# Patient Record
Sex: MALE | Race: WHITE | NOT HISPANIC OR LATINO | Employment: FULL TIME | URBAN - METROPOLITAN AREA
[De-identification: names, ages, dates, MRNs, and addresses within clinical notes are randomized per-mention and may not be internally consistent; named-entity substitution may affect disease eponyms.]

---

## 2021-12-30 ENCOUNTER — TELEPHONE (OUTPATIENT)
Dept: SCHEDULING | Facility: IMAGING CENTER | Age: 26
End: 2021-12-30

## 2022-02-08 ENCOUNTER — TELEMEDICINE (OUTPATIENT)
Dept: MEDICAL GROUP | Facility: PHYSICIAN GROUP | Age: 27
End: 2022-02-08
Payer: COMMERCIAL

## 2022-02-08 VITALS — WEIGHT: 270 LBS | RESPIRATION RATE: 18 BRPM | HEIGHT: 71 IN | BODY MASS INDEX: 37.8 KG/M2

## 2022-02-08 DIAGNOSIS — U07.1 COVID-19: ICD-10-CM

## 2022-02-08 PROCEDURE — 99203 OFFICE O/P NEW LOW 30 MIN: CPT | Performed by: INTERNAL MEDICINE

## 2022-02-08 RX ORDER — BENZONATATE 100 MG/1
100 CAPSULE ORAL 3 TIMES DAILY PRN
Qty: 60 CAPSULE | Refills: 0 | Status: SHIPPED | OUTPATIENT
Start: 2022-02-08 | End: 2022-04-19

## 2022-02-08 ASSESSMENT — PATIENT HEALTH QUESTIONNAIRE - PHQ9: CLINICAL INTERPRETATION OF PHQ2 SCORE: 0

## 2022-02-08 NOTE — PROGRESS NOTES
"This visit was conducted via  Zoom Video Virtual Visit using secure and encrypted videoconferencing technology.   The patient's identity was confirmed and verbal consent was obtained for this virtual visit.  -------------------------------------------------------------------------------    Chief Complaint   Patient presents with   • Establish Care       recent covid infection     HPI: This is a 27 y.o. pt.  Pt's medical history is notable for:      COVID-19  Pt reported positive covid test 1.27.22  First day symptoms began: 1.25.22    Current symptoms: lingering cough, loss of taste.  Patient denies fever chills shortness of breath or wheezing.    Presence of dyspnea [sob] [] Yes  [x]No  Patient does not have a pulse oximeter to check O2 saturation  Patient denies chest pain lethargy abdominal pain or chest pain.  No difficulty with eating and drinking.               REVIEW OF SYSTEMS:   As per HPI above. All other systems reviewed and negative.          Allergies: Patient has no allergy information on record.    Current Outpatient Medications Ordered in Epic   Medication Sig Dispense Refill   • benzonatate (TESSALON) 100 MG Cap Take 1 Capsule by mouth 3 times a day as needed for Cough. 60 Capsule 0     No current Epic-ordered facility-administered medications on file.       Past Medical, Social, and Family history reviewed and updated in EPIC    ------------------------------------------------------------------    Vitals:    02/08/22 0905   Resp: 18     Vitals:    02/08/22 0905   Weight: 122 kg (270 lb)   Height: 1.803 m (5' 11\")        PHYSICAL EXAM:   Psych:  A&O x 3, mood and affect appropriate  Constitutional: no distress  Skin: No apparent rashes  ENMT: Lips without lesions. Phonation normal.  Respiratory: Unlabored respiratory effort      ---------------------------------------------------------------------    ASSESSMENT and PLAN:   1. COVID-19    A. Pt's dyspnea assessment:  [x] No dyspnea      B. Pt's " oxygenation assessment: not available    is pt using oxygen currently?     []Yes  []No        C. Other acuity factors:   Chest pain/discomfort  []Yes  [x]No    Dizziness   []Yes  [x]No      Hypotension   []Yes  [x]No       Dizziness/lightheadedness []Yes  [x]No     Confusion   []Yes  [x]No    Lethargy   []Yes  [x]No    Fever / chills   []Yes  [x]No        Recommendations:        -Discussed with pt regarding symptom management with medications :   Tylenol 500mg q6hrs as needed for fever, headaches, pain  For sore throat: rec warm salt water gargles soft foods. Throat coats /chloraseptic sprays prn.  Saline nasal spray and Flonase as a decongestant  Mucinex [over the counter ] as needed to help with loosening mucous in the airways.  Tessalon 100mg tid prn for cough rx sent to pharm    -Advised pt regarding isolation/quarantine as recommended by CDC guidelines. Use face mask.  Pt rec to refer to CDC COVID-19 website general questions about covid19.      -Recommend supportive care:   Stay well hydrated, rest and the use of incentive spirometry [available over the counter] to help reexpand the lungs  -Recommend the use of pulse oximeter [available over the counter] to check for blood oxygen level and the goal to keep the blood oxygen saturation at or above 90%  -Check temperature twice daily    Rec pt to go to ER if Temp >101, chills, Oxygen saturation <90%, new or worsening shortness of breath or trouble breathing, worsening cough, chest pain, dizziness, lightheadedness, lethargy, confusion, headaches, change in vision, motor weakness, abdominal pain, the inability to keep fluids/ foods down, etc... or any change in the pt's condition.   Rec pt to arrange a followup virtual appointments with pcp in approx :  7 days      -If any problems should arise, patient was advised to contact our office for followup or go to ER to be evaluated.    --------------------------------------------------------------------             Please note that this dictation was created using voice recognition software. I have made every reasonable attempt to correct obvious errors but there may be errors of grammar and content that I may have overlooked prior to finalization of this note.      Aniceto Rocha MD  Internal Medicine  [facility]

## 2022-02-08 NOTE — ASSESSMENT & PLAN NOTE
Pt reported positive covid test 1.27.22  First day symptoms began: 1.25.22    Current symptoms: lingering cough, loss of taste.  Patient denies fever chills shortness of breath or wheezing.    Presence of dyspnea [sob] [] Yes  [x]No  Patient does not have a pulse oximeter to check O2 saturation  Patient denies chest pain lethargy abdominal pain chest pain.  No difficulty with eating and drinking.

## 2022-04-19 ENCOUNTER — OFFICE VISIT (OUTPATIENT)
Dept: MEDICAL GROUP | Facility: PHYSICIAN GROUP | Age: 27
End: 2022-04-19
Payer: COMMERCIAL

## 2022-04-19 ENCOUNTER — HOSPITAL ENCOUNTER (OUTPATIENT)
Facility: MEDICAL CENTER | Age: 27
End: 2022-04-19
Attending: INTERNAL MEDICINE
Payer: COMMERCIAL

## 2022-04-19 ENCOUNTER — HOSPITAL ENCOUNTER (OUTPATIENT)
Dept: RADIOLOGY | Facility: MEDICAL CENTER | Age: 27
End: 2022-04-19
Attending: INTERNAL MEDICINE
Payer: COMMERCIAL

## 2022-04-19 VITALS
WEIGHT: 269.5 LBS | RESPIRATION RATE: 17 BRPM | BODY MASS INDEX: 37.73 KG/M2 | OXYGEN SATURATION: 97 % | TEMPERATURE: 97.6 F | HEART RATE: 74 BPM | DIASTOLIC BLOOD PRESSURE: 72 MMHG | HEIGHT: 71 IN | SYSTOLIC BLOOD PRESSURE: 110 MMHG

## 2022-04-19 DIAGNOSIS — R05.9 COUGH: ICD-10-CM

## 2022-04-19 DIAGNOSIS — R07.81 RIB PAIN: ICD-10-CM

## 2022-04-19 LAB
EXTERNAL QUALITY CONTROL: NORMAL
FLUAV+FLUBV AG SPEC QL IA: NEGATIVE
INT CON NEG: NEGATIVE
INT CON NEG: NEGATIVE
INT CON POS: POSITIVE
INT CON POS: POSITIVE
S PYO AG THROAT QL: NEGATIVE
SARS-COV+SARS-COV-2 AG RESP QL IA.RAPID: NEGATIVE

## 2022-04-19 PROCEDURE — 71101 X-RAY EXAM UNILAT RIBS/CHEST: CPT | Mod: RT

## 2022-04-19 PROCEDURE — U0003 INFECTIOUS AGENT DETECTION BY NUCLEIC ACID (DNA OR RNA); SEVERE ACUTE RESPIRATORY SYNDROME CORONAVIRUS 2 (SARS-COV-2) (CORONAVIRUS DISEASE [COVID-19]), AMPLIFIED PROBE TECHNIQUE, MAKING USE OF HIGH THROUGHPUT TECHNOLOGIES AS DESCRIBED BY CMS-2020-01-R: HCPCS

## 2022-04-19 PROCEDURE — 87804 INFLUENZA ASSAY W/OPTIC: CPT | Performed by: INTERNAL MEDICINE

## 2022-04-19 PROCEDURE — 87880 STREP A ASSAY W/OPTIC: CPT | Performed by: INTERNAL MEDICINE

## 2022-04-19 PROCEDURE — 99214 OFFICE O/P EST MOD 30 MIN: CPT | Performed by: INTERNAL MEDICINE

## 2022-04-19 PROCEDURE — U0005 INFEC AGEN DETEC AMPLI PROBE: HCPCS

## 2022-04-19 PROCEDURE — 87426 SARSCOV CORONAVIRUS AG IA: CPT | Performed by: INTERNAL MEDICINE

## 2022-04-19 NOTE — ASSESSMENT & PLAN NOTE
New condition    Pt reported symptoms started: approx 3.22.2022    Current symptoms: persistent cough, congestion    Fever/chills: denies  SOB /wheezing: denies    Denies recent known COVID exposure.

## 2022-04-19 NOTE — PROGRESS NOTES
"PRIMARY CARE CLINIC VISIT  Chief Complaint   Patient presents with   • Follow-Up     Cough  Sided rib pain    History of Present Illness     Cough  New condition    Pt reported symptoms started: approx 3.22.2022    Current symptoms: persistent cough, congestion    Fever/chills: denies  SOB /wheezing: denies    Denies recent known COVID exposure.      Rib pain  Patient reported right-sided rib cage pain noted since last few days.  He denies any recent trauma or injury.        No current outpatient medications on file prior to visit.     No current facility-administered medications on file prior to visit.        Allergies: Patient has no known allergies.    ROS  As per HPI above. All other systems reviewed and negative.      Past Medical, Social, and Family history reviewed and updated in EPIC     Objective     /72 (BP Location: Left arm, Patient Position: Sitting, BP Cuff Size: Adult)   Pulse 74   Temp 36.4 °C (97.6 °F) (Temporal)   Resp 17   Ht 1.803 m (5' 11\")   Wt 122 kg (269 lb 8 oz)   SpO2 97%    Body mass index is 37.59 kg/m².    General: alert and oriented  Cardiovascular: regular rate and rhythm  Pulmonary: lungs : no wheezing   Gastrointestinal: BS present. No obvious mass noted  Nose with mom mucosa formation no purulent drainage oropharynx with erythema no exudates    Ribs slight pain noted palpation right lateral rib region.  No palpable defects noted    Assessment and Plan     1. Cough    - SARS-CoV-2, PCR (In-House): Collect NP OR nasal swab in VTM; Future  - POCT SARS-COV Antigen EZE Manual Result  - POCT Influenza A/B  - POCT Rapid Strep A  - Hydrocod Polst-CPM Polst ER (TUSSIONEX) 10-8 MG/5ML Suspension Extended Release; Take 5 mL by mouth 3 times a day as needed for Cough or Rhinitis for up to 10 days.  Dispense: 100 mL; Refill: 0    · The patient's presenting symptoms and exam findings most likely are due to a viral etiology.    · Test for COVID-19 ordered. Result will be released to " MyChart application for patient review.   · Symptomatic and supportive care:   · Advised pt to stay well hydrated and plenty of rest   · For sore throat: rec warm salt water gargles soft foods. Throat coats /chloraseptic sprays prn.  · Saline nasal spray and Flonase as a decongestant.   · Infection control measures at home.  Hand washing, covering sneeze/cough, disinfect surfaces.   May try otc mucinex as needed [expectorant]    Recommend follow-up in about 10 days    - Return to clinic if not better. Go to urgent care or ER is symptoms worsen /change such as temperature >101, worsening shortness of breath, wheezing, worsening cough, chest pain, dizziness, lightheadedness, lethargy, confusion, headaches, change in vision, motor weakness, abdominal pain, the inability to keep fluids/ foods down, etc... or any change in the pt's condition.       2. Rib pain  Likely related to increased cough recently.  X-ray ordered.  - [id number] (WITH 1-VIEW CXR) RIGHT; Future         Please note that this dictation was created using voice recognition software. I have made every reasonable attempt to correct obvious errors but there may be errors of grammar and content that I may have overlooked prior to finalization of this note.      Aniceto Rocha MD  Internal Medicine  [facility]

## 2022-04-19 NOTE — ASSESSMENT & PLAN NOTE
Patient reported right-sided rib cage pain noted since last few days.  He denies any recent trauma or injury.

## 2022-04-20 DIAGNOSIS — R05.9 COUGH: ICD-10-CM

## 2022-04-20 LAB
COVID ORDER STATUS COVID19: NORMAL
SARS-COV-2 RNA RESP QL NAA+PROBE: NOTDETECTED
SPECIMEN SOURCE: NORMAL

## 2022-05-02 DIAGNOSIS — R05.9 COUGH: ICD-10-CM

## 2022-05-02 DIAGNOSIS — T78.40XD ALLERGY, SUBSEQUENT ENCOUNTER: ICD-10-CM

## 2022-09-25 ENCOUNTER — HOSPITAL ENCOUNTER (OUTPATIENT)
Facility: MEDICAL CENTER | Age: 27
End: 2022-09-25
Attending: FAMILY MEDICINE
Payer: COMMERCIAL

## 2022-09-25 ENCOUNTER — OFFICE VISIT (OUTPATIENT)
Dept: URGENT CARE | Facility: PHYSICIAN GROUP | Age: 27
End: 2022-09-25
Payer: COMMERCIAL

## 2022-09-25 VITALS
RESPIRATION RATE: 16 BRPM | SYSTOLIC BLOOD PRESSURE: 126 MMHG | BODY MASS INDEX: 35 KG/M2 | TEMPERATURE: 97.4 F | DIASTOLIC BLOOD PRESSURE: 84 MMHG | OXYGEN SATURATION: 97 % | WEIGHT: 250 LBS | HEART RATE: 84 BPM | HEIGHT: 71 IN

## 2022-09-25 DIAGNOSIS — J02.0 STREP THROAT: ICD-10-CM

## 2022-09-25 DIAGNOSIS — J02.0 STREP PHARYNGITIS: ICD-10-CM

## 2022-09-25 DIAGNOSIS — N50.812 PAIN IN BOTH TESTICLES: ICD-10-CM

## 2022-09-25 DIAGNOSIS — N50.811 PAIN IN BOTH TESTICLES: ICD-10-CM

## 2022-09-25 LAB
APPEARANCE UR: NORMAL
BILIRUB UR STRIP-MCNC: NORMAL MG/DL
COLOR UR AUTO: NORMAL
GLUCOSE UR STRIP.AUTO-MCNC: NORMAL MG/DL
INT CON NEG: NEGATIVE
INT CON POS: POSITIVE
KETONES UR STRIP.AUTO-MCNC: NORMAL MG/DL
LEUKOCYTE ESTERASE UR QL STRIP.AUTO: NORMAL
NITRITE UR QL STRIP.AUTO: NORMAL
PH UR STRIP.AUTO: 5.5 [PH] (ref 5–8)
PROT UR QL STRIP: 30 MG/DL
RBC UR QL AUTO: NORMAL
S PYO AG THROAT QL: POSITIVE
SP GR UR STRIP.AUTO: >=1.03
UROBILINOGEN UR STRIP-MCNC: 1 MG/DL

## 2022-09-25 PROCEDURE — 87591 N.GONORRHOEAE DNA AMP PROB: CPT

## 2022-09-25 PROCEDURE — 87086 URINE CULTURE/COLONY COUNT: CPT

## 2022-09-25 PROCEDURE — 87880 STREP A ASSAY W/OPTIC: CPT | Performed by: FAMILY MEDICINE

## 2022-09-25 PROCEDURE — 87491 CHLMYD TRACH DNA AMP PROBE: CPT

## 2022-09-25 PROCEDURE — 81002 URINALYSIS NONAUTO W/O SCOPE: CPT | Performed by: FAMILY MEDICINE

## 2022-09-25 PROCEDURE — 99214 OFFICE O/P EST MOD 30 MIN: CPT | Performed by: FAMILY MEDICINE

## 2022-09-25 RX ORDER — AMOXICILLIN 875 MG/1
875 TABLET, COATED ORAL 2 TIMES DAILY
Qty: 20 TABLET | Refills: 0 | Status: SHIPPED | OUTPATIENT
Start: 2022-09-25 | End: 2022-10-05

## 2022-09-25 RX ORDER — ACETAMINOPHEN 500 MG
500-1000 TABLET ORAL EVERY 6 HOURS PRN
COMMUNITY
End: 2023-12-04

## 2022-09-26 ENCOUNTER — HOSPITAL ENCOUNTER (OUTPATIENT)
Dept: RADIOLOGY | Facility: MEDICAL CENTER | Age: 27
End: 2022-09-26
Attending: FAMILY MEDICINE
Payer: COMMERCIAL

## 2022-09-26 DIAGNOSIS — N50.811 PAIN IN BOTH TESTICLES: ICD-10-CM

## 2022-09-26 DIAGNOSIS — N50.812 PAIN IN BOTH TESTICLES: ICD-10-CM

## 2022-09-26 LAB
C TRACH DNA SPEC QL NAA+PROBE: NEGATIVE
N GONORRHOEA DNA SPEC QL NAA+PROBE: NEGATIVE
SPECIMEN SOURCE: NORMAL

## 2022-09-26 PROCEDURE — 76870 US EXAM SCROTUM: CPT

## 2022-09-28 LAB
BACTERIA UR CULT: NORMAL
SIGNIFICANT IND 70042: NORMAL
SITE SITE: NORMAL
SOURCE SOURCE: NORMAL

## 2022-09-30 DIAGNOSIS — N50.812 PAIN IN BOTH TESTICLES: ICD-10-CM

## 2022-09-30 DIAGNOSIS — N50.811 PAIN IN BOTH TESTICLES: ICD-10-CM

## 2023-10-24 ENCOUNTER — OFFICE VISIT (OUTPATIENT)
Dept: URGENT CARE | Facility: PHYSICIAN GROUP | Age: 28
End: 2023-10-24
Payer: COMMERCIAL

## 2023-10-24 VITALS
HEIGHT: 71 IN | SYSTOLIC BLOOD PRESSURE: 122 MMHG | TEMPERATURE: 97.2 F | DIASTOLIC BLOOD PRESSURE: 88 MMHG | HEART RATE: 88 BPM | WEIGHT: 284.17 LBS | OXYGEN SATURATION: 96 % | BODY MASS INDEX: 39.78 KG/M2 | RESPIRATION RATE: 16 BRPM

## 2023-10-24 DIAGNOSIS — H66.001 NON-RECURRENT ACUTE SUPPURATIVE OTITIS MEDIA OF RIGHT EAR WITHOUT SPONTANEOUS RUPTURE OF TYMPANIC MEMBRANE: ICD-10-CM

## 2023-10-24 PROCEDURE — 99213 OFFICE O/P EST LOW 20 MIN: CPT | Performed by: PHYSICIAN ASSISTANT

## 2023-10-24 PROCEDURE — 3074F SYST BP LT 130 MM HG: CPT | Performed by: PHYSICIAN ASSISTANT

## 2023-10-24 PROCEDURE — 3079F DIAST BP 80-89 MM HG: CPT | Performed by: PHYSICIAN ASSISTANT

## 2023-10-24 RX ORDER — CEFDINIR 300 MG/1
300 CAPSULE ORAL 2 TIMES DAILY
Qty: 14 CAPSULE | Refills: 0 | Status: SHIPPED | OUTPATIENT
Start: 2023-10-24 | End: 2023-10-31

## 2023-10-24 ASSESSMENT — ENCOUNTER SYMPTOMS
ABDOMINAL PAIN: 0
FEVER: 0
CHILLS: 0
VOMITING: 0
SORE THROAT: 0
PALPITATIONS: 0
SHORTNESS OF BREATH: 0
SINUS PAIN: 0
COUGH: 1
NAUSEA: 0
MYALGIAS: 0
DIAPHORESIS: 0
HEADACHES: 0
SPUTUM PRODUCTION: 0
DIZZINESS: 0
WHEEZING: 0
DIARRHEA: 0

## 2023-10-24 NOTE — PROGRESS NOTES
Subjective:     CHIEF COMPLAINT  Chief Complaint   Patient presents with    Ear Fullness     Pain in RT ear, fullness and muffled hearing b/l xlast night.        HPI  Minor Ferro is a very pleasant 28 y.o. male who presents to the clinic with right ear pain x1 day.  Patient describes a pressure sensation in both ears.  Experiencing a sharp pain to the right ear.  He has been sick with URI-like symptoms over the last 1-2 weeks.  Denies any ear discharge or drainage.  No fevers, chills or myalgias.  No OTC medications have been started at this time.    REVIEW OF SYSTEMS  Review of Systems   Constitutional:  Negative for chills, diaphoresis, fever and malaise/fatigue.   HENT:  Positive for congestion and ear pain. Negative for ear discharge, sinus pain and sore throat.    Respiratory:  Positive for cough. Negative for sputum production, shortness of breath and wheezing.    Cardiovascular:  Negative for chest pain and palpitations.   Gastrointestinal:  Negative for abdominal pain, diarrhea, nausea and vomiting.   Musculoskeletal:  Negative for myalgias.   Neurological:  Negative for dizziness and headaches.   Endo/Heme/Allergies:  Negative for environmental allergies.       PAST MEDICAL HISTORY  Patient Active Problem List    Diagnosis Date Noted    Cough 04/19/2022    Rib pain 04/19/2022    COVID-19 02/08/2022       SURGICAL HISTORY  patient denies any surgical history    ALLERGIES  Allergies   Allergen Reactions    Amoxicillin Rash     Rashes    Penicillins      Dad is allergic to penicillin.        CURRENT MEDICATIONS  Home Medications       Reviewed by Sukhwinder Hawkins P.A.-C. (Physician Assistant) on 10/24/23 at 0826  Med List Status: <None>     Medication Last Dose Status   acetaminophen (TYLENOL) 500 MG Tab Taking Active                    SOCIAL HISTORY  Social History     Tobacco Use    Smoking status: Never    Smokeless tobacco: Never   Vaping Use    Vaping Use: Never used   Substance and Sexual Activity     "Alcohol use: Yes     Comment: social     Drug use: Yes     Types: Marijuana     Comment: sociall smoke marijuana, couple times a year    Sexual activity: Not on file       FAMILY HISTORY  History reviewed. No pertinent family history.       Objective:     VITAL SIGNS: /88 (BP Location: Left arm, Patient Position: Sitting, BP Cuff Size: Large adult)   Pulse 88   Temp 36.2 °C (97.2 °F) (Temporal)   Resp 16   Ht 1.803 m (5' 11\")   Wt (!) 129 kg (284 lb 2.8 oz)   SpO2 96%   BMI 39.63 kg/m²     PHYSICAL EXAM  Physical Exam  Constitutional:       General: He is not in acute distress.     Appearance: Normal appearance. He is not ill-appearing, toxic-appearing or diaphoretic.   HENT:      Head: Normocephalic and atraumatic.      Right Ear: Ear canal and external ear normal.      Left Ear: Tympanic membrane, ear canal and external ear normal.      Ears:      Comments: Right TM bulging, erythematous with purulence posteriorly.  Canal patent.  Left TM mildly erythematous without bulging.     Nose: Nose normal. No congestion or rhinorrhea.      Mouth/Throat:      Mouth: Mucous membranes are moist.      Pharynx: No oropharyngeal exudate or posterior oropharyngeal erythema.      Comments: Posterior oropharynx erythematous.  Tonsil stones present to the right tonsil.  Midline uvula without deviation.  Eyes:      Conjunctiva/sclera: Conjunctivae normal.   Pulmonary:      Effort: Pulmonary effort is normal.   Musculoskeletal:      Cervical back: Normal range of motion. No muscular tenderness.   Skin:     General: Skin is warm and dry.   Neurological:      Mental Status: He is alert.   Psychiatric:         Mood and Affect: Mood normal.         Thought Content: Thought content normal.         Assessment/Plan:     1. Non-recurrent acute suppurative otitis media of right ear without spontaneous rupture of tympanic membrane  - cefdinir (OMNICEF) 300 MG Cap; Take 1 Capsule by mouth 2 times a day for 7 days.  Dispense: 14 " Capsule; Refill: 0      MDM/Comments:    -Take antibiotic as directed.  -Oral hydration.  -Ibuprofen or tylenol as directed for pain and/or fevers.   -Follow up with primary care provider.    Follow up for failure to improve after 48 to 72 hours of antibiotic therapy, worsening symptoms, persistent fevers, ear drainage, persistent or increased pain, lethargy, decreased urine output, complaint of headache or stiff neck, persistent vomiting or diarrhea, or any other concerns.      Differential diagnosis, natural history, supportive care, and indications for immediate follow-up discussed. All questions answered. Patient agrees with the plan of care.    Follow-up as needed if symptoms worsen or fail to improve to PCP, Urgent care or Emergency Room.    I have personally reviewed prior external notes and test results pertinent to today's visit.  I have independently reviewed and interpreted all diagnostics ordered during this urgent care acute visit.   Discussed management options (risks,benefits, and alternatives to treatment). Pt expresses understanding and the treatment plan was agreed upon. Questions were encouraged and answered to pt's satisfaction.    Please note that this dictation was created using voice recognition software. I have made a reasonable attempt to correct obvious errors, but I expect that there are errors of grammar and possibly content that I did not discover before finalizing the note.

## 2023-10-31 ENCOUNTER — OFFICE VISIT (OUTPATIENT)
Dept: URGENT CARE | Facility: PHYSICIAN GROUP | Age: 28
End: 2023-10-31
Payer: COMMERCIAL

## 2023-10-31 VITALS
OXYGEN SATURATION: 98 % | TEMPERATURE: 97.3 F | RESPIRATION RATE: 18 BRPM | SYSTOLIC BLOOD PRESSURE: 118 MMHG | HEIGHT: 71 IN | WEIGHT: 285 LBS | BODY MASS INDEX: 39.9 KG/M2 | HEART RATE: 68 BPM | DIASTOLIC BLOOD PRESSURE: 52 MMHG

## 2023-10-31 DIAGNOSIS — H92.01 EAR PAIN, RIGHT: ICD-10-CM

## 2023-10-31 DIAGNOSIS — R09.81 SINUS CONGESTION: ICD-10-CM

## 2023-10-31 DIAGNOSIS — H69.91 EUSTACHIAN TUBE DYSFUNCTION, RIGHT: ICD-10-CM

## 2023-10-31 PROCEDURE — 3078F DIAST BP <80 MM HG: CPT | Performed by: NURSE PRACTITIONER

## 2023-10-31 PROCEDURE — 3074F SYST BP LT 130 MM HG: CPT | Performed by: NURSE PRACTITIONER

## 2023-10-31 PROCEDURE — 99214 OFFICE O/P EST MOD 30 MIN: CPT | Performed by: NURSE PRACTITIONER

## 2023-10-31 RX ORDER — PREDNISONE 20 MG/1
20 TABLET ORAL DAILY
Qty: 3 TABLET | Refills: 0 | Status: SHIPPED | OUTPATIENT
Start: 2023-10-31 | End: 2023-11-03

## 2023-10-31 RX ORDER — FLUTICASONE PROPIONATE 50 MCG
2 SPRAY, SUSPENSION (ML) NASAL DAILY
Qty: 9.9 ML | Refills: 0 | Status: SHIPPED | OUTPATIENT
Start: 2023-10-31 | End: 2023-12-04

## 2023-10-31 ASSESSMENT — ENCOUNTER SYMPTOMS
SHORTNESS OF BREATH: 0
SORE THROAT: 0
ABDOMINAL PAIN: 0
MYALGIAS: 0
SINUS PAIN: 0
VOMITING: 0
COUGH: 0
NECK PAIN: 0
CONSTIPATION: 0
DIARRHEA: 0
DIZZINESS: 0
WEAKNESS: 0
EYE DISCHARGE: 0
WHEEZING: 0
FEVER: 0
EYE REDNESS: 0
HEADACHES: 0
NAUSEA: 0
CHILLS: 0

## 2023-10-31 NOTE — PROGRESS NOTES
Subjective     Minor Ferro is a 28 y.o. male who presents with Otalgia (Pt was seen for an ear infection did they full course of the med prescribed and did not improve at all, both ears have been hurt since last visit )            Otalgia   Pertinent negatives include no abdominal pain, coughing, diarrhea, ear discharge, headaches, hearing loss, neck pain, rash, sore throat or vomiting.    was seen in urgent care for ear pain 7 days ago and was placed on oral antibiotics for ear infection.  [facility] antibiotics did not improve pain.  Has been taking over-the-counter antihistamine as instructed but stopped this a couple days ago.  No nasal spray or rinse used, no salt water gargle.  States sinuses and throat have improved.  No noted fever, malaise or body aches.  No noted dizziness.  Denies history of seasonal allergies.    PMH:  has no past medical history on file.  MEDS:   Current Outpatient Medications:     predniSONE (DELTASONE) 20 MG Tab, Take 1 Tablet by mouth every day for 3 days., Disp: 3 Tablet, Rfl: 0    fluticasone (FLONASE) 50 MCG/ACT nasal spray, Administer 2 Sprays into affected nostril(S) every day., Disp: 9.9 mL, Rfl: 0    cefdinir (OMNICEF) 300 MG Cap, Take 1 Capsule by mouth 2 times a day for 7 days., Disp: 14 Capsule, Rfl: 0    acetaminophen (TYLENOL) 500 MG Tab, Take 500-1,000 mg by mouth every 6 hours as needed., Disp: , Rfl:   ALLERGIES:   Allergies   Allergen Reactions    Amoxicillin Rash     Rashes    Penicillins      Dad is allergic to penicillin.      SURGHX: History reviewed. No pertinent surgical history.  SOCHX:  reports that he has never smoked. He has never used smokeless tobacco. He reports current alcohol use. He reports current drug use. Drug: Marijuana.  FH: Family history was reviewed, no pertinent findings to report      Review of Systems   Constitutional:  Negative for chills, fever and malaise/fatigue.   HENT:  Positive for congestion and ear pain. Negative for ear  "discharge, hearing loss, sinus pain, sore throat and tinnitus.    Eyes:  Negative for discharge and redness.   Respiratory:  Negative for cough, shortness of breath and wheezing.    Gastrointestinal:  Negative for abdominal pain, constipation, diarrhea, nausea and vomiting.   Musculoskeletal:  Negative for myalgias and neck pain.   Skin:  Negative for itching and rash.   Neurological:  Negative for dizziness, weakness and headaches.   Endo/Heme/Allergies:  Negative for environmental allergies.   All other systems reviewed and are negative.             Objective     /52   Pulse 68   Temp 36.3 °C (97.3 °F) (Temporal)   Resp 18   Ht 1.803 m (5' 11\")   Wt (!) 129 kg (285 lb)   SpO2 98%   BMI 39.75 kg/m²      Physical Exam  Vitals reviewed.   Constitutional:       General: He is awake. He is not in acute distress.     Appearance: Normal appearance. He is not ill-appearing, toxic-appearing or diaphoretic.   HENT:      Head: Normocephalic.      Right Ear: Ear canal and external ear normal. No drainage, swelling or tenderness. A middle ear effusion is present. No mastoid tenderness. Tympanic membrane is not injected, perforated, erythematous, retracted or bulging.      Left Ear: Tympanic membrane, ear canal and external ear normal.      Nose: Nose normal.      Mouth/Throat:      Lips: Pink.      Mouth: Mucous membranes are dry.      Pharynx: Oropharynx is clear. Uvula midline.   Cardiovascular:      Rate and Rhythm: Normal rate.   Pulmonary:      Effort: Pulmonary effort is normal.   Musculoskeletal:      Cervical back: Normal range of motion and neck supple.   Skin:     General: Skin is warm and dry.   Neurological:      Mental Status: He is alert and oriented to person, place, and time.   Psychiatric:         Attention and Perception: Attention normal.         Mood and Affect: Mood normal.         Speech: Speech normal.         Behavior: Behavior normal. Behavior is cooperative.                       "       Assessment & Plan        1. Sinus congestion    - predniSONE (DELTASONE) 20 MG Tab; Take 1 Tablet by mouth every day for 3 days.  Dispense: 3 Tablet; Refill: 0  - fluticasone (FLONASE) 50 MCG/ACT nasal spray; Administer 2 Sprays into affected nostril(S) every day.  Dispense: 9.9 mL; Refill: 0    2. Ear pain, right    - predniSONE (DELTASONE) 20 MG Tab; Take 1 Tablet by mouth every day for 3 days.  Dispense: 3 Tablet; Refill: 0    3. Eustachian tube dysfunction, right    - predniSONE (DELTASONE) 20 MG Tab; Take 1 Tablet by mouth every day for 3 days.  Dispense: 3 Tablet; Refill: 0  - fluticasone (FLONASE) 50 MCG/ACT nasal spray; Administer 2 Sprays into affected nostril(S) every day.  Dispense: 9.9 mL; Refill: 0     --Discussed side effect on longer durations of oral decongestants due to drying effect that can contribute to increased nasal sinus/ear pressures and drier oral mucus membranes- recommend to avoid at this time  -Recommend the following:  -May use over the counter NSAID/Tylenol for any fever or ear/sinus pain  -May use over the counter longer acting allergy medication for any sinus congestion/post nasal drip related to ear pressure (chose one: Claritin/Zyrtec/Allegra without decongestant) x 1 week then as needed   -May use saline nasal spray for nasal congestion/post nasal drip as needed up to 4x/day   -May use over the counter nasal decongestant like Flonase/Nasacort as needed for sinus congestion related to ear pressures x 1 week then as needed   -Maintain hydration status   -Monitor for fevers, difficulty or abnormal hearing, pain in ears, headache, dizziness- need re-evaluation in urgent care    -Education provided: see above    -Return to urgent care as needed if new or worsening symptoms  -Patient expresses understanding to treatment and plan of care  -Questions were encouraged and answered  -Recommended over the counter meds were written down when requested   -Advised to follow-up with the  primary care provider for recheck, reevaluation, and consideration of further management as needed     -Time spent evaluating this patient was at least 30 minutes. This time comprises of the following: preparing for visit/chart review, patient history taken into account pertinent to symptoms, patient counseling/education for needed treatment outpatient, exam/evaluation/treatment plan provided, ordering any appropriate lab/test/procedures/meds, independent interpretation of any clinic testing, medication management with any other listed medications and chart documentation.

## 2023-12-01 SDOH — ECONOMIC STABILITY: FOOD INSECURITY: WITHIN THE PAST 12 MONTHS, THE FOOD YOU BOUGHT JUST DIDN'T LAST AND YOU DIDN'T HAVE MONEY TO GET MORE.: NEVER TRUE

## 2023-12-01 SDOH — HEALTH STABILITY: PHYSICAL HEALTH: ON AVERAGE, HOW MANY DAYS PER WEEK DO YOU ENGAGE IN MODERATE TO STRENUOUS EXERCISE (LIKE A BRISK WALK)?: 2 DAYS

## 2023-12-01 SDOH — ECONOMIC STABILITY: HOUSING INSECURITY: IN THE LAST 12 MONTHS, HOW MANY PLACES HAVE YOU LIVED?: 1

## 2023-12-01 SDOH — ECONOMIC STABILITY: HOUSING INSECURITY
IN THE LAST 12 MONTHS, WAS THERE A TIME WHEN YOU DID NOT HAVE A STEADY PLACE TO SLEEP OR SLEPT IN A SHELTER (INCLUDING NOW)?: NO

## 2023-12-01 SDOH — ECONOMIC STABILITY: FOOD INSECURITY: WITHIN THE PAST 12 MONTHS, YOU WORRIED THAT YOUR FOOD WOULD RUN OUT BEFORE YOU GOT MONEY TO BUY MORE.: NEVER TRUE

## 2023-12-01 SDOH — ECONOMIC STABILITY: TRANSPORTATION INSECURITY
IN THE PAST 12 MONTHS, HAS LACK OF RELIABLE TRANSPORTATION KEPT YOU FROM MEDICAL APPOINTMENTS, MEETINGS, WORK OR FROM GETTING THINGS NEEDED FOR DAILY LIVING?: NO

## 2023-12-01 SDOH — ECONOMIC STABILITY: TRANSPORTATION INSECURITY
IN THE PAST 12 MONTHS, HAS LACK OF TRANSPORTATION KEPT YOU FROM MEETINGS, WORK, OR FROM GETTING THINGS NEEDED FOR DAILY LIVING?: NO

## 2023-12-01 SDOH — ECONOMIC STABILITY: TRANSPORTATION INSECURITY
IN THE PAST 12 MONTHS, HAS THE LACK OF TRANSPORTATION KEPT YOU FROM MEDICAL APPOINTMENTS OR FROM GETTING MEDICATIONS?: NO

## 2023-12-01 SDOH — HEALTH STABILITY: MENTAL HEALTH
STRESS IS WHEN SOMEONE FEELS TENSE, NERVOUS, ANXIOUS, OR CAN'T SLEEP AT NIGHT BECAUSE THEIR MIND IS TROUBLED. HOW STRESSED ARE YOU?: NOT AT ALL

## 2023-12-01 SDOH — HEALTH STABILITY: PHYSICAL HEALTH: ON AVERAGE, HOW MANY MINUTES DO YOU ENGAGE IN EXERCISE AT THIS LEVEL?: 20 MIN

## 2023-12-01 SDOH — ECONOMIC STABILITY: INCOME INSECURITY: IN THE LAST 12 MONTHS, WAS THERE A TIME WHEN YOU WERE NOT ABLE TO PAY THE MORTGAGE OR RENT ON TIME?: NO

## 2023-12-01 SDOH — ECONOMIC STABILITY: INCOME INSECURITY: HOW HARD IS IT FOR YOU TO PAY FOR THE VERY BASICS LIKE FOOD, HOUSING, MEDICAL CARE, AND HEATING?: NOT HARD AT ALL

## 2023-12-01 ASSESSMENT — SOCIAL DETERMINANTS OF HEALTH (SDOH)
HOW OFTEN DO YOU GET TOGETHER WITH FRIENDS OR RELATIVES?: ONCE A WEEK
IN A TYPICAL WEEK, HOW MANY TIMES DO YOU TALK ON THE PHONE WITH FAMILY, FRIENDS, OR NEIGHBORS?: ONCE A WEEK
HOW OFTEN DO YOU ATTEND CHURCH OR RELIGIOUS SERVICES?: NEVER
HOW OFTEN DO YOU GET TOGETHER WITH FRIENDS OR RELATIVES?: ONCE A WEEK
HOW OFTEN DO YOU ATTENT MEETINGS OF THE CLUB OR ORGANIZATION YOU BELONG TO?: NEVER
HOW OFTEN DO YOU HAVE SIX OR MORE DRINKS ON ONE OCCASION: LESS THAN MONTHLY
WITHIN THE PAST 12 MONTHS, YOU WORRIED THAT YOUR FOOD WOULD RUN OUT BEFORE YOU GOT THE MONEY TO BUY MORE: NEVER TRUE
DO YOU BELONG TO ANY CLUBS OR ORGANIZATIONS SUCH AS CHURCH GROUPS UNIONS, FRATERNAL OR ATHLETIC GROUPS, OR SCHOOL GROUPS?: NO
HOW OFTEN DO YOU ATTEND CHURCH OR RELIGIOUS SERVICES?: NEVER
DO YOU BELONG TO ANY CLUBS OR ORGANIZATIONS SUCH AS CHURCH GROUPS UNIONS, FRATERNAL OR ATHLETIC GROUPS, OR SCHOOL GROUPS?: NO
HOW MANY DRINKS CONTAINING ALCOHOL DO YOU HAVE ON A TYPICAL DAY WHEN YOU ARE DRINKING: 3 OR 4
IN A TYPICAL WEEK, HOW MANY TIMES DO YOU TALK ON THE PHONE WITH FAMILY, FRIENDS, OR NEIGHBORS?: ONCE A WEEK
HOW OFTEN DO YOU ATTENT MEETINGS OF THE CLUB OR ORGANIZATION YOU BELONG TO?: NEVER
HOW HARD IS IT FOR YOU TO PAY FOR THE VERY BASICS LIKE FOOD, HOUSING, MEDICAL CARE, AND HEATING?: NOT HARD AT ALL
HOW OFTEN DO YOU HAVE A DRINK CONTAINING ALCOHOL: MONTHLY OR LESS

## 2023-12-01 ASSESSMENT — LIFESTYLE VARIABLES
HOW OFTEN DO YOU HAVE SIX OR MORE DRINKS ON ONE OCCASION: LESS THAN MONTHLY
HOW MANY STANDARD DRINKS CONTAINING ALCOHOL DO YOU HAVE ON A TYPICAL DAY: 3 OR 4
SKIP TO QUESTIONS 9-10: 0
AUDIT-C TOTAL SCORE: 3
HOW OFTEN DO YOU HAVE A DRINK CONTAINING ALCOHOL: MONTHLY OR LESS

## 2023-12-04 ENCOUNTER — OFFICE VISIT (OUTPATIENT)
Dept: MEDICAL GROUP | Facility: PHYSICIAN GROUP | Age: 28
End: 2023-12-04
Payer: COMMERCIAL

## 2023-12-04 VITALS
HEIGHT: 70 IN | TEMPERATURE: 98 F | SYSTOLIC BLOOD PRESSURE: 118 MMHG | WEIGHT: 291.2 LBS | DIASTOLIC BLOOD PRESSURE: 78 MMHG | OXYGEN SATURATION: 97 % | HEART RATE: 73 BPM | BODY MASS INDEX: 41.69 KG/M2

## 2023-12-04 DIAGNOSIS — J30.1 ALLERGIC RHINITIS DUE TO POLLEN, UNSPECIFIED SEASONALITY: ICD-10-CM

## 2023-12-04 DIAGNOSIS — Z00.00 ANNUAL PHYSICAL EXAM: ICD-10-CM

## 2023-12-04 DIAGNOSIS — R06.83 SNORING: ICD-10-CM

## 2023-12-04 DIAGNOSIS — Z11.59 NEED FOR HEPATITIS C SCREENING TEST: ICD-10-CM

## 2023-12-04 DIAGNOSIS — E66.01 SEVERE OBESITY (HCC): ICD-10-CM

## 2023-12-04 DIAGNOSIS — Z11.4 SCREENING FOR HIV (HUMAN IMMUNODEFICIENCY VIRUS): ICD-10-CM

## 2023-12-04 PROBLEM — E66.9 OBESITY: Status: ACTIVE | Noted: 2023-12-04

## 2023-12-04 PROBLEM — J30.9 ALLERGIC RHINITIS: Status: ACTIVE | Noted: 2023-12-04

## 2023-12-04 PROCEDURE — 3078F DIAST BP <80 MM HG: CPT | Performed by: INTERNAL MEDICINE

## 2023-12-04 PROCEDURE — 99395 PREV VISIT EST AGE 18-39: CPT | Performed by: INTERNAL MEDICINE

## 2023-12-04 PROCEDURE — 3074F SYST BP LT 130 MM HG: CPT | Performed by: INTERNAL MEDICINE

## 2023-12-04 ASSESSMENT — PATIENT HEALTH QUESTIONNAIRE - PHQ9: CLINICAL INTERPRETATION OF PHQ2 SCORE: 0

## 2023-12-04 NOTE — ASSESSMENT & PLAN NOTE
Chronic condition.  The patient reported that he no longer uses fluticasone.  Patient denies fever chills shortness of breath or wheezing.

## 2023-12-04 NOTE — PROGRESS NOTES
"PRIMARY CARE CLINIC VISIT    Chief complaint:    Pt is here for Annual Exam      History of Present Illness     Severe obesity (HCC)  Chronic condition.  Discussed with the patient regarding diet, exercise, and lifestyle modification to help achieve and maintain healthy weight         Allergic rhinitis  Chronic condition.  The patient reported that he no longer uses fluticasone.  Patient denies fever chills shortness of breath or wheezing.    Snoring  Chronic condition.  Today we discussed the possibility of possible sleep apnea.  I have submitted a request for the patient to see sleep medicine specialist to consider sleep study to be done      Allergies: Amoxicillin and Penicillins    Current Outpatient Medications Ordered in Epic   Medication Sig Dispense Refill    multivitamin Tab Take 1 Tablet by mouth every day.      fluticasone (FLONASE) 50 MCG/ACT nasal spray Administer 2 Sprays into affected nostril(S) every day. 9.9 mL 0     No current Epic-ordered facility-administered medications on file.       History reviewed. No pertinent past medical history.    History reviewed. No pertinent surgical history.    History reviewed. No pertinent family history.    Social History     Tobacco Use   Smoking Status Never   Smokeless Tobacco Never       Social History     Substance and Sexual Activity   Alcohol Use Yes    Comment: social        Review of systems:  As per HPI above. All other systems reviewed and negative.      Past Medical, Social, and Family history reviewed and updated in EPIC     Objective     /78 (BP Location: Left arm, Patient Position: Sitting, BP Cuff Size: Large adult)   Pulse 73   Temp 36.7 °C (98 °F) (Temporal)   Ht 1.778 m (5' 10\")   Wt (!) 132 kg (291 lb 3.2 oz)   SpO2 97%    Body mass index is 41.78 kg/m².    General: alert in no apparent distress.  Cardiovascular: regular rate and rhythm  Pulmonary: lungs : no wheezing   Gastrointestinal: BS present.   Cranial nerves II to XII " grossly intact  Oropharynx no exudates        Assessment and Plan     1. Annual physical exam  - HEMOGLOBIN A1C; Future  - ALANINE AMINO-TRANS; Future  - Basic Metabolic Panel; Future  - CBC WITHOUT DIFFERENTIAL; Future  - Lipid Profile; Future  - TSH; Future  - MICROALBUMIN CREAT RATIO URINE; Future  - VITAMIN D,25 HYDROXY (DEFICIENCY); Future  Routine lab ordered.  For the patient to call for results after few days.      2. Allergic rhinitis due to pollen, unspecified seasonality  Chronic stable condition.  The patient asymptomatic.  Patient no longer uses Flonase.  Continue to monitor.    3. Severe obesity (HCC)  Chronic condition.  Uncontrolled.  Recommend diet and exercise.  Encouraged patient to maintain ideal weight.  Recommend follow-up in 6 months.  If no improvement noted consider the use of medication at that time.  - Referral to Nutrition Services    4. Snoring  Chronic condition.  Uncontrolled.  Rule out possible sleep apnea.  - Referral to Sleep Medicine    5. Need for hepatitis C screening test  - HEP C VIRUS ANTIBODY; Future    6. Screening for HIV (human immunodeficiency virus)  - HIV AG/AB COMBO ASSAY SCREENING; Future    Other orders  - multivitamin Tab; Take 1 Tablet by mouth every day.                  ANTICIPATORY GUIDANCE  Patient Counseling:  -Cholesterol screening. Fasting lipid panel  -Diabetes screening. Fasting blood sugar  -Diet: advised lean meats, fruits, vegetables, whole grains  -Discussed Healthful lifestyle measures. Pt to avoid tobacco, ETOH, and drug use.  -Pt to continue with regular exercise/walking activities  -Advised sun protection, sunscreen use  -Injury prevention: discussed safety seat belts  -Discussed preventive immunizations  -Recommend patient to schedule a yearly eye examination and dental exam         Healthcare Maintenance       Discussed with the patient today regarding chickenpox vaccine Tdap vaccine.  The patient will check his records at [facility] regarding his immunization status.  Patient declined influenza vaccine.                Please note that this dictation was created using voice recognition software. I have made every reasonable attempt to correct obvious errors, but I expect that there are errors of grammar and possibly content that I did not discover before finalizing the note.    Aniceto Rocha MD  Internal Medicine  [facility]

## 2023-12-05 ENCOUNTER — HOSPITAL ENCOUNTER (OUTPATIENT)
Dept: LAB | Facility: MEDICAL CENTER | Age: 28
End: 2023-12-05
Attending: INTERNAL MEDICINE
Payer: COMMERCIAL

## 2023-12-05 DIAGNOSIS — Z00.00 ANNUAL PHYSICAL EXAM: ICD-10-CM

## 2023-12-05 DIAGNOSIS — Z11.59 NEED FOR HEPATITIS C SCREENING TEST: ICD-10-CM

## 2023-12-05 DIAGNOSIS — Z11.4 SCREENING FOR HIV (HUMAN IMMUNODEFICIENCY VIRUS): ICD-10-CM

## 2023-12-05 LAB
ALT SERPL-CCNC: 82 U/L (ref 2–50)
ANION GAP SERPL CALC-SCNC: 11 MMOL/L (ref 7–16)
BUN SERPL-MCNC: 16 MG/DL (ref 8–22)
CALCIUM SERPL-MCNC: 9.1 MG/DL (ref 8.5–10.5)
CHLORIDE SERPL-SCNC: 105 MMOL/L (ref 96–112)
CHOLEST SERPL-MCNC: 173 MG/DL (ref 100–199)
CO2 SERPL-SCNC: 25 MMOL/L (ref 20–33)
CREAT SERPL-MCNC: 0.97 MG/DL (ref 0.5–1.4)
CREAT UR-MCNC: 130.3 MG/DL
ERYTHROCYTE [DISTWIDTH] IN BLOOD BY AUTOMATED COUNT: 40.7 FL (ref 35.9–50)
EST. AVERAGE GLUCOSE BLD GHB EST-MCNC: 117 MG/DL
FASTING STATUS PATIENT QL REPORTED: NORMAL
GFR SERPLBLD CREATININE-BSD FMLA CKD-EPI: 107 ML/MIN/1.73 M 2
GLUCOSE SERPL-MCNC: 102 MG/DL (ref 65–99)
HBA1C MFR BLD: 5.7 % (ref 4–5.6)
HCT VFR BLD AUTO: 49.9 % (ref 42–52)
HDLC SERPL-MCNC: 43 MG/DL
HGB BLD-MCNC: 16.9 G/DL (ref 14–18)
LDLC SERPL CALC-MCNC: 91 MG/DL
MCH RBC QN AUTO: 30.3 PG (ref 27–33)
MCHC RBC AUTO-ENTMCNC: 33.9 G/DL (ref 32.3–36.5)
MCV RBC AUTO: 89.6 FL (ref 81.4–97.8)
MICROALBUMIN UR-MCNC: 1.2 MG/DL
MICROALBUMIN/CREAT UR: 9 MG/G (ref 0–30)
PLATELET # BLD AUTO: 430 K/UL (ref 164–446)
PMV BLD AUTO: 9 FL (ref 9–12.9)
POTASSIUM SERPL-SCNC: 5 MMOL/L (ref 3.6–5.5)
RBC # BLD AUTO: 5.57 M/UL (ref 4.7–6.1)
SODIUM SERPL-SCNC: 141 MMOL/L (ref 135–145)
TRIGL SERPL-MCNC: 194 MG/DL (ref 0–149)
WBC # BLD AUTO: 8.2 K/UL (ref 4.8–10.8)

## 2023-12-05 PROCEDURE — 36415 COLL VENOUS BLD VENIPUNCTURE: CPT

## 2023-12-05 PROCEDURE — 80048 BASIC METABOLIC PNL TOTAL CA: CPT

## 2023-12-05 PROCEDURE — 86803 HEPATITIS C AB TEST: CPT

## 2023-12-05 PROCEDURE — 85027 COMPLETE CBC AUTOMATED: CPT

## 2023-12-05 PROCEDURE — 84443 ASSAY THYROID STIM HORMONE: CPT

## 2023-12-05 PROCEDURE — 80061 LIPID PANEL: CPT

## 2023-12-05 PROCEDURE — 82570 ASSAY OF URINE CREATININE: CPT

## 2023-12-05 PROCEDURE — 82043 UR ALBUMIN QUANTITATIVE: CPT

## 2023-12-05 PROCEDURE — 87389 HIV-1 AG W/HIV-1&-2 AB AG IA: CPT

## 2023-12-05 PROCEDURE — 84460 ALANINE AMINO (ALT) (SGPT): CPT

## 2023-12-05 PROCEDURE — 83036 HEMOGLOBIN GLYCOSYLATED A1C: CPT

## 2023-12-05 PROCEDURE — 82306 VITAMIN D 25 HYDROXY: CPT

## 2023-12-06 ENCOUNTER — HOSPITAL ENCOUNTER (OUTPATIENT)
Dept: LAB | Facility: MEDICAL CENTER | Age: 28
End: 2023-12-06
Attending: INTERNAL MEDICINE
Payer: COMMERCIAL

## 2023-12-06 DIAGNOSIS — R74.8 LIVER ENZYME ELEVATION: ICD-10-CM

## 2023-12-06 DIAGNOSIS — R73.03 PREDIABETES: ICD-10-CM

## 2023-12-06 DIAGNOSIS — E78.5 DYSLIPIDEMIA: ICD-10-CM

## 2023-12-06 LAB
25(OH)D3 SERPL-MCNC: 32 NG/ML (ref 30–100)
ALBUMIN SERPL BCP-MCNC: 4.5 G/DL (ref 3.2–4.9)
ALP SERPL-CCNC: 83 U/L (ref 30–99)
ALT SERPL-CCNC: 95 U/L (ref 2–50)
AST SERPL-CCNC: 62 U/L (ref 12–45)
BILIRUB CONJ SERPL-MCNC: <0.2 MG/DL (ref 0.1–0.5)
BILIRUB INDIRECT SERPL-MCNC: ABNORMAL MG/DL (ref 0–1)
BILIRUB SERPL-MCNC: 1.3 MG/DL (ref 0.1–1.5)
HBV SURFACE AB SERPL IA-ACNC: <3.5 MIU/ML (ref 0–10)
HBV SURFACE AG SER QL: NORMAL
HCV AB SER QL: NORMAL
HCV AB SER QL: NORMAL
HIV 1+2 AB+HIV1 P24 AG SERPL QL IA: NORMAL
PROT SERPL-MCNC: 8.1 G/DL (ref 6–8.2)
TSH SERPL DL<=0.005 MIU/L-ACNC: 1.22 UIU/ML (ref 0.38–5.33)

## 2023-12-06 PROCEDURE — 80076 HEPATIC FUNCTION PANEL: CPT

## 2023-12-06 PROCEDURE — 86706 HEP B SURFACE ANTIBODY: CPT

## 2023-12-06 PROCEDURE — 36415 COLL VENOUS BLD VENIPUNCTURE: CPT

## 2023-12-06 PROCEDURE — 86803 HEPATITIS C AB TEST: CPT

## 2023-12-06 PROCEDURE — 87340 HEPATITIS B SURFACE AG IA: CPT

## 2023-12-07 ENCOUNTER — HOSPITAL ENCOUNTER (OUTPATIENT)
Dept: RADIOLOGY | Facility: MEDICAL CENTER | Age: 28
End: 2023-12-07
Attending: INTERNAL MEDICINE
Payer: COMMERCIAL

## 2023-12-07 DIAGNOSIS — R74.8 LIVER ENZYME ELEVATION: ICD-10-CM

## 2023-12-07 PROBLEM — K76.0 FATTY LIVER: Status: ACTIVE | Noted: 2023-12-07

## 2023-12-07 PROCEDURE — 76705 ECHO EXAM OF ABDOMEN: CPT

## 2024-10-12 ENCOUNTER — TELEPHONE (OUTPATIENT)
Dept: URGENT CARE | Facility: PHYSICIAN GROUP | Age: 29
End: 2024-10-12

## 2024-10-12 ENCOUNTER — OFFICE VISIT (OUTPATIENT)
Dept: URGENT CARE | Facility: PHYSICIAN GROUP | Age: 29
End: 2024-10-12
Payer: COMMERCIAL

## 2024-10-12 VITALS
SYSTOLIC BLOOD PRESSURE: 110 MMHG | RESPIRATION RATE: 16 BRPM | HEIGHT: 70 IN | DIASTOLIC BLOOD PRESSURE: 76 MMHG | TEMPERATURE: 96.9 F | WEIGHT: 287.92 LBS | OXYGEN SATURATION: 96 % | BODY MASS INDEX: 41.22 KG/M2 | HEART RATE: 96 BPM

## 2024-10-12 DIAGNOSIS — J02.9 SORE THROAT: ICD-10-CM

## 2024-10-12 DIAGNOSIS — J02.8 ACUTE PHARYNGITIS DUE TO OTHER SPECIFIED ORGANISMS: ICD-10-CM

## 2024-10-12 LAB — S PYO DNA SPEC NAA+PROBE: NOT DETECTED

## 2024-10-12 PROCEDURE — 3078F DIAST BP <80 MM HG: CPT | Performed by: NURSE PRACTITIONER

## 2024-10-12 PROCEDURE — 3074F SYST BP LT 130 MM HG: CPT | Performed by: NURSE PRACTITIONER

## 2024-10-12 PROCEDURE — 87651 STREP A DNA AMP PROBE: CPT | Performed by: NURSE PRACTITIONER

## 2024-10-12 PROCEDURE — 99213 OFFICE O/P EST LOW 20 MIN: CPT | Performed by: NURSE PRACTITIONER

## 2024-10-12 ASSESSMENT — ENCOUNTER SYMPTOMS
NAUSEA: 0
DIARRHEA: 0
FEVER: 0
COUGH: 0
SORE THROAT: 1
EYE DISCHARGE: 0
ORTHOPNEA: 0
CHILLS: 1
HEADACHES: 1
MYALGIAS: 1

## 2024-10-12 ASSESSMENT — FIBROSIS 4 INDEX: FIB4 SCORE: 0.46

## 2025-01-02 ENCOUNTER — HOSPITAL ENCOUNTER (OUTPATIENT)
Facility: MEDICAL CENTER | Age: 30
End: 2025-01-02
Payer: COMMERCIAL

## 2025-01-02 ENCOUNTER — OFFICE VISIT (OUTPATIENT)
Dept: URGENT CARE | Facility: PHYSICIAN GROUP | Age: 30
End: 2025-01-02
Payer: COMMERCIAL

## 2025-01-02 VITALS
DIASTOLIC BLOOD PRESSURE: 86 MMHG | RESPIRATION RATE: 15 BRPM | HEART RATE: 84 BPM | HEIGHT: 71 IN | BODY MASS INDEX: 41.44 KG/M2 | TEMPERATURE: 97.5 F | OXYGEN SATURATION: 97 % | SYSTOLIC BLOOD PRESSURE: 122 MMHG | WEIGHT: 296 LBS

## 2025-01-02 DIAGNOSIS — J35.1 SWELLING OF TONSIL: ICD-10-CM

## 2025-01-02 DIAGNOSIS — J02.9 PHARYNGITIS, UNSPECIFIED ETIOLOGY: ICD-10-CM

## 2025-01-02 LAB — S PYO DNA SPEC NAA+PROBE: NOT DETECTED

## 2025-01-02 PROCEDURE — 99213 OFFICE O/P EST LOW 20 MIN: CPT

## 2025-01-02 PROCEDURE — 87651 STREP A DNA AMP PROBE: CPT

## 2025-01-02 PROCEDURE — 87070 CULTURE OTHR SPECIMN AEROBIC: CPT

## 2025-01-02 PROCEDURE — 3079F DIAST BP 80-89 MM HG: CPT

## 2025-01-02 PROCEDURE — 3074F SYST BP LT 130 MM HG: CPT

## 2025-01-02 RX ORDER — LIDOCAINE HYDROCHLORIDE 20 MG/ML
5 SOLUTION OROPHARYNGEAL EVERY 6 HOURS PRN
Qty: 100 ML | Refills: 0 | Status: SHIPPED | OUTPATIENT
Start: 2025-01-02 | End: 2025-01-07

## 2025-01-02 RX ORDER — PREDNISONE 20 MG/1
20 TABLET ORAL DAILY
Qty: 3 TABLET | Refills: 0 | Status: SHIPPED | OUTPATIENT
Start: 2025-01-02 | End: 2025-01-05

## 2025-01-02 ASSESSMENT — ENCOUNTER SYMPTOMS
MYALGIAS: 0
NECK PAIN: 0
VOMITING: 0
WHEEZING: 0
SORE THROAT: 1
CHILLS: 0
FEVER: 0
HEADACHES: 0
ABDOMINAL PAIN: 0
COUGH: 0
SHORTNESS OF BREATH: 0
SPUTUM PRODUCTION: 0
DIARRHEA: 0
STRIDOR: 0
DIZZINESS: 0
SINUS PAIN: 0
WEAKNESS: 0
NAUSEA: 0

## 2025-01-02 ASSESSMENT — FIBROSIS 4 INDEX: FIB4 SCORE: 0.46

## 2025-01-02 ASSESSMENT — VISUAL ACUITY: OU: 1

## 2025-01-02 NOTE — PROGRESS NOTES
Subjective     Minor Ferro is a 29 y.o. male who presents with Pharyngitis (X 3 days, white spots on tonsils, swollen tongue./Bump on both hands x this morning.)    HPI:   Minor is a 30yo male presenting for sore throat x3 days. Reports associated painful swallowing and scattered bumps on bilateral hands and left foot. Denies abdominal pain, vomiting, or diarrhea. No fever. No cough, shortness of breath, or wheezing. He has attempted Tylenol and ibuprofen for relief. Denies dysphagia or difficulty maintaining oral secretions. No neck pain or swelling.          Review of Systems   Constitutional:  Negative for chills, fever and malaise/fatigue.   HENT:  Positive for sore throat. Negative for congestion, ear discharge, ear pain and sinus pain.    Respiratory:  Negative for cough, sputum production, shortness of breath, wheezing and stridor.    Gastrointestinal:  Negative for abdominal pain, diarrhea, nausea and vomiting.   Musculoskeletal:  Negative for myalgias and neck pain.   Skin:  Positive for rash.   Neurological:  Negative for dizziness, weakness and headaches.     History reviewed. No pertinent past medical history.     History reviewed. No pertinent surgical history.     Allergies: Amoxicillin and Penicillins     Current Outpatient Medications:     multivitamin Tab, Take 1 Tablet by mouth every day., Disp: , Rfl:      Social History     Tobacco Use    Smoking status: Never    Smokeless tobacco: Never   Vaping Use    Vaping status: Never Used   Substance Use Topics    Alcohol use: Yes     Comment: social     Drug use: Not Currently     Types: Marijuana     Comment: sociall smoke marijuana, couple times a year     History reviewed. No pertinent family history.     Medications, Allergies, and current problem list reviewed today in Epic.      Objective     /86 (BP Location: Left arm, Patient Position: Sitting, BP Cuff Size: Adult)   Pulse 84   Temp 36.4 °C (97.5 °F) (Temporal)   Resp 15   Ht  "1.803 m (5' 11\")   Wt (!) 134 kg (296 lb)   SpO2 97%   BMI 41.28 kg/m²      Physical Exam  Vitals reviewed.   Constitutional:       General: He is not in acute distress.  HENT:      Head: No right periorbital erythema or left periorbital erythema.      Jaw: There is normal jaw occlusion. No tenderness, swelling, pain on movement or malocclusion.      Right Ear: Tympanic membrane, ear canal and external ear normal.      Left Ear: Tympanic membrane, ear canal and external ear normal.      Nose: Nose normal.      Mouth/Throat:      Lips: No lesions.      Mouth: No oral lesions or angioedema.      Tongue: No lesions. Tongue does not deviate from midline.      Palate: No mass and lesions.      Pharynx: Uvula midline. Posterior oropharyngeal erythema present. No uvula swelling.      Tonsils: Tonsillar exudate present. No tonsillar abscesses. 3+ on the right. 2+ on the left.   Eyes:      General: Vision grossly intact. Gaze aligned appropriately. No visual field deficit.     Extraocular Movements: Extraocular movements intact.      Conjunctiva/sclera: Conjunctivae normal.      Pupils: Pupils are equal, round, and reactive to light.      Right eye: Pupil is round, reactive and not sluggish.      Left eye: Pupil is round, reactive and not sluggish.      Funduscopic exam:     Right eye: Red reflex present.         Left eye: Red reflex present.  Neck:      Trachea: Trachea normal. No abnormal tracheal secretions or tracheal deviation.   Cardiovascular:      Rate and Rhythm: Normal rate and regular rhythm.      Pulses: Normal pulses.      Heart sounds: Normal heart sounds.   Pulmonary:      Effort: Pulmonary effort is normal. No tachypnea, accessory muscle usage, prolonged expiration, respiratory distress or retractions.      Breath sounds: Normal breath sounds. No stridor, decreased air movement or transmitted upper airway sounds. No wheezing, rhonchi or rales.   Musculoskeletal:         General: Normal range of motion.     "  Cervical back: Full passive range of motion without pain, normal range of motion and neck supple. No erythema, rigidity, tenderness or crepitus. No pain with movement. Normal range of motion.   Lymphadenopathy:      Cervical: Cervical adenopathy present.   Skin:     General: Skin is warm and dry.      Findings: Rash present.      Comments: Scattered circumferential bumps to bilateral hands and bottom of left foot. No purulent discharge.     Neurological:      Mental Status: He is alert. Mental status is at baseline.   Psychiatric:         Mood and Affect: Mood normal.         Behavior: Behavior normal.         Thought Content: Thought content normal.       Results for orders placed or performed in visit on 01/02/25   POCT CEPHEID GROUP A STREP - PCR    Collection Time: 01/02/25  8:31 AM   Result Value Ref Range    POC Group A Strep, PCR Not Detected Not Detected, Invalid     Assessment & Plan     1. Pharyngitis, unspecified etiology   - POCT CEPHEID GROUP A STREP - PCR  - CULTURE THROAT; Future  - lidocaine (XYLOCAINE) 2 % Solution; Take 5 mL by mouth every 6 hours as needed for Throat/Mouth Pain for up to 5 days.  Dispense: 100 mL; Refill: 0    2. Swelling of tonsil   - predniSONE (DELTASONE) 20 MG Tab; Take 1 Tablet by mouth every day for 3 days.  Dispense: 3 Tablet; Refill: 0       MDM/Comments:    Patient has stable vital signs and is non-toxic appearing.       Patient tested negative for strep A. Presentation is consistent with viral pharyngitis. No drooling, airway compromise, or deviated uvula present. No hoarseness in voice. Will obtain throat culture.    Viral exanthem rash. Supportive measures encouraged.   Discussed treatment plan with patient, patient is agreeable and verbalized understanding. Educated patient on signs and symptoms to watch out for, when to return to the clinic or go to the ER.      Management includes soft foods, increased fluids.   Management of symptoms is discussed and expected  course is outlined.   Patient instructed to return to office in 24-48 hours if not improving  Patient instructed to present to Emergency Room if notes unilateral swelling, muffled voice, difficulty handling secretions  I considered other causes of pharyngitis including Group C, G strep, peritonsillar abscess, ag's angina, and retropharyngeal abscess but the patient's reported symptoms and my exam do not support these alternative diagnosis based on information I have available today.  This may change and I encouraged the patient to return to clinic if they are experiencing new symptoms or their symptoms fail to resolve with time as we cannot rule out all pathology from a single Urgent Care visit.      Seek emergency care if:   severe pain on one side of the throat  difficulty and pain when swallowing or opening the mouth  fever and chills  headache  earache  drooling  a muffled or hoarse voice  Bad breath      Differential diagnosis, natural history, supportive care, and indications for immediate follow-up discussed.      Recommended supportive treatment at home:    Warm salt water gargles   Increased fluid intake     Follow-up as needed if symptoms worsen or fail to improve to PCP, Urgent care or Emergency Room.                                       Electronically signed by ALFONSO Miller

## 2025-01-04 LAB
BACTERIA SPEC RESP CULT: NORMAL
SIGNIFICANT IND 70042: NORMAL
SITE SITE: NORMAL
SOURCE SOURCE: NORMAL

## 2025-03-14 NOTE — PROGRESS NOTES
" Cc:   sore throat  #2. Bilat testicular pain.          Pharyngitis   This is a new problem. The current episode started in the past 2 days. The problem has been unchanged. There has been no fever. The pain is moderate.   Pertinent negatives include no abdominal pain, congestion, coughing, diarrhea, headaches, shortness of breath or vomiting. no exposure to strep or mono.  Pt has tried acetaminophen for the symptoms. The treatment provided mild relief.      #2.  C/o dull achy bilat testicular pain x 3 d.  Denies dysuria or discharge.       Review of Systems   Constitutional: Positive for malaise/fatigue. Negative for fever and weight loss.   HENT: Positive for sore throat  Respiratory: Negative for cough, sputum production and shortness of breath.    Cardiovascular: Negative for chest pain.   Gastrointestinal: Negative for nausea, vomiting, abdominal pain and diarrhea.      Neurological: Negative for dizziness and headaches.   All other systems reviewed and are negative.         Objective:   /84 (BP Location: Left arm, Patient Position: Sitting, BP Cuff Size: Large adult)   Pulse 84   Temp 36.3 °C (97.4 °F) (Temporal)   Resp 16   Ht 1.803 m (5' 11\")   Wt 113 kg (250 lb)   SpO2 97%             Physical Exam   Constitutional:   oriented to person, place, and time.  appears well-developed and well-nourished. No distress.   HENT:   Head: Normocephalic and atraumatic.   Right Ear: External ear normal.   Left Ear: External ear normal.   Nose: Mucosal edema present. Right sinus exhibits no maxillary sinus tenderness and no frontal sinus tenderness. Left sinus exhibits no maxillary sinus tenderness and no frontal sinus tenderness.   Mouth/Throat:    There is posterior oropharyngeal erythema AND EXUDATE present. No posterior oropharyngeal edema.   Tonsils 2+ bilaterally     Eyes: Conjunctivae and EOM are normal. Pupils are equal, round, and reactive to light. Right eye exhibits no discharge. Left eye exhibits no " discharge. No scleral icterus.   Neck: Normal range of motion. Neck supple. No JVD present. No tracheal deviation present. No thyromegaly present.   Cardiovascular: Normal rate, regular rhythm, normal heart sounds and intact distal pulses.  Exam reveals no friction rub.    No murmur heard.  Pulmonary/Chest: Effort normal and breath sounds normal. No respiratory distress.   no wheezes.   no rales.    Musculoskeletal:  exhibits no edema.   Lymphadenopathy:     Positive Cervical adenopathy.   :  +TTP over both testes.   No erythema, no edema, no masses noted.   No hernia noted.   Phallus - unremarkable.  No rash or d/c  Neurological:   alert and oriented to person, place, and time.   Skin: Skin is warm and dry. No erythema.   Psychiatric:   normal mood and affect.   Nursing note and vitals reviewed.    Lab Results   Component Value Date/Time    POCCOLOR Doreen 09/25/2022 03:35 PM    POCAPPEAR Cloudy 09/25/2022 03:35 PM    POCLEUKEST Neg 09/25/2022 03:35 PM    POCNITRITE Neg 09/25/2022 03:35 PM    POCUROBILIGE 1.0 09/25/2022 03:35 PM    POCPROTEIN 30 09/25/2022 03:35 PM    POCURPH 5.5 09/25/2022 03:35 PM    POCBLOOD Neg 09/25/2022 03:35 PM    POCSPGRV >=1.030 09/25/2022 03:35 PM    POCKETONES Trace 09/25/2022 03:35 PM    POCBILIRUBIN Small 09/25/2022 03:35 PM    POCGLUCUA Neg 09/25/2022 03:35 PM               Assessment/Plan:        1. Strep pharyngitis     - rapid strep positive        - amoxicillin (AMOXIL) 875 MG tablet; Take 1 Tablet by mouth 2 times a day for 10 days.  Dispense: 20 Tablet; Refill: 0    Differential diagnosis, natural history, supportive care, and indications for immediate follow-up discussed. All questions answered. Patient agrees with the plan of care.     Follow-up as needed if symptoms worsen or fail to improve to PCP, Urgent care or Emergency Room.     I have personally reviewed prior external notes and test results pertinent to today's visit.  I have independently reviewed and interpreted all  diagnostics ordered during this urgent care acute visit.     2. Pain in both testicles  Etiology unclear  UA unremarkable.     Urine sent for cx, GC assay      U/s scheduled  Will call w/results    - [id number]; Future  - US-INGUINAL HERNIA; Future           13-Mar-2025 22:05